# Patient Record
Sex: FEMALE | Race: WHITE | NOT HISPANIC OR LATINO | URBAN - METROPOLITAN AREA
[De-identification: names, ages, dates, MRNs, and addresses within clinical notes are randomized per-mention and may not be internally consistent; named-entity substitution may affect disease eponyms.]

---

## 2018-09-06 ENCOUNTER — APPOINTMENT (OUTPATIENT)
Age: 43
Setting detail: DERMATOLOGY
End: 2018-09-10

## 2018-09-06 VITALS
DIASTOLIC BLOOD PRESSURE: 100 MMHG | WEIGHT: 192 LBS | HEIGHT: 63 IN | SYSTOLIC BLOOD PRESSURE: 167 MMHG | HEART RATE: 70 BPM

## 2018-09-06 DIAGNOSIS — L10.0 PEMPHIGUS VULGARIS: ICD-10-CM

## 2018-09-06 PROBLEM — L30.9 DERMATITIS, UNSPECIFIED: Status: ACTIVE | Noted: 2018-09-06

## 2018-09-06 PROCEDURE — OTHER PATIENT SPECIFIC COUNSELING: OTHER

## 2018-09-06 PROCEDURE — OTHER PRESCRIPTION: OTHER

## 2018-09-06 PROCEDURE — 11100: CPT

## 2018-09-06 PROCEDURE — 99203 OFFICE O/P NEW LOW 30 MIN: CPT | Mod: 25

## 2018-09-06 PROCEDURE — OTHER COUNSELING: OTHER

## 2018-09-06 PROCEDURE — OTHER MIPS QUALITY: OTHER

## 2018-09-06 PROCEDURE — 11101: CPT

## 2018-09-06 PROCEDURE — OTHER BIOPSY BY PUNCH METHOD: OTHER

## 2018-09-06 RX ORDER — MUPIROCIN 20 MG/G
OINTMENT TOPICAL
Qty: 1 | Refills: 0 | Status: ERX | COMMUNITY
Start: 2018-09-06

## 2018-09-06 RX ORDER — PREDNISONE 10 MG/1
TABLET ORAL QAM
Qty: 70 | Refills: 0 | Status: ERX | COMMUNITY
Start: 2018-09-06

## 2018-09-06 ASSESSMENT — LOCATION DETAILED DESCRIPTION DERM
LOCATION DETAILED: LEFT RIB CAGE
LOCATION DETAILED: RIGHT BUTTOCK
LOCATION DETAILED: SUBXIPHOID
LOCATION DETAILED: RIGHT PROXIMAL POSTERIOR THIGH
LOCATION DETAILED: RIGHT ANTERIOR PROXIMAL THIGH
LOCATION DETAILED: MID POSTERIOR NECK

## 2018-09-06 ASSESSMENT — LOCATION SIMPLE DESCRIPTION DERM
LOCATION SIMPLE: RIGHT THIGH
LOCATION SIMPLE: POSTERIOR NECK
LOCATION SIMPLE: RIGHT BUTTOCK
LOCATION SIMPLE: ABDOMEN
LOCATION SIMPLE: RIGHT POSTERIOR THIGH

## 2018-09-06 ASSESSMENT — SEVERITY ASSESSMENT: SEVERITY: MILD

## 2018-09-06 ASSESSMENT — PAIN INTENSITY VAS: HOW INTENSE IS YOUR PAIN 0 BEING NO PAIN, 10 BEING THE MOST SEVERE PAIN POSSIBLE?: NO PAIN

## 2018-09-06 ASSESSMENT — BSA RASH: BSA RASH: 4

## 2018-09-06 ASSESSMENT — LOCATION ZONE DERM
LOCATION ZONE: TRUNK
LOCATION ZONE: NECK
LOCATION ZONE: LEG

## 2018-09-06 NOTE — PROCEDURE: BIOPSY BY PUNCH METHOD
Bill For Surgical Tray: no
Biopsy Type: DIF (perilesional)
Dressing: bandage
Home Suture Removal Text: Patient was provided a home suture removal kit and will remove their sutures at home.  If they have any questions or difficulties they will call the office.
Anesthesia Type: 1% lidocaine without epinephrine and a 1:10 solution of 8.4% sodium bicarbonate
Notification Instructions: Patient will be notified of biopsy results. However, patient instructed to call the office if not contacted within 2 weeks.
Wound Care: Petrolatum
Size Of Lesion In Cm (Optional): 0
Suture Removal: 14 days
Detail Level: Detailed
Punch Size In Mm: 4
Post-Care Instructions: I reviewed with the patient in detail post-care instructions. Patient is to keep the biopsy site dry overnight, and then apply bacitracin twice daily until healed. Patient may apply hydrogen peroxide soaks to remove any crusting.
Consent: Written consent was obtained and risks were reviewed including but not limited to scarring, infection, bleeding, scabbing, incomplete removal, nerve damage and allergy to anesthesia.
Was A Bandage Applied: Yes
Anesthesia Volume In Cc (Will Not Render If 0): 0.5
Epidermal Sutures: 4-0 Nylon
Hemostasis: None
Biopsy Type: H and E
Billing Type: Third-Party Bill

## 2018-09-06 NOTE — PROCEDURE: PATIENT SPECIFIC COUNSELING
Discussed potential differential diagnoses with the patient. Recommended that a punch biopsy be performed today to a new lesion to provide a definitive diagnosis before further treatment is given. Instructed the patient to apply mupirocin 2% ointment to all open lesions and blisters to promote healing. Instructed the patient to discontinue use of azithromycin 250 mg and instead initiate use of prednisone 10 mg, taking one pill by mouth four times a day for seven days then decrease by one pill every seven days. Explained that once pathology results are received, a referral to [facility] may be necessary for further treatment. The patient will follow up in two weeks for suture removal. Discussed potential differential diagnoses with the patient. Recommended that a punch biopsy be performed today to a new lesion to provide a definitive diagnosis before further treatment is given. Instructed the patient to apply mupirocin 2% ointment to all open lesions and blisters to promote healing. Instructed the patient to discontinue use of azithromycin 250 mg and instead initiate use of prednisone 10 mg, taking one pill by mouth four times a day for seven days then decrease by one pill every seven days. Explained that once pathology results are received, a referral to [facility] may be necessary for further treatment. The patient will follow up in two weeks for suture removal.

## 2018-09-20 ENCOUNTER — APPOINTMENT (OUTPATIENT)
Age: 43
Setting detail: DERMATOLOGY
End: 2018-09-21

## 2018-09-20 VITALS
WEIGHT: 185 LBS | DIASTOLIC BLOOD PRESSURE: 100 MMHG | HEART RATE: 82 BPM | HEIGHT: 63 IN | SYSTOLIC BLOOD PRESSURE: 177 MMHG

## 2018-09-20 DIAGNOSIS — L01.03 BULLOUS IMPETIGO: ICD-10-CM

## 2018-09-20 PROCEDURE — OTHER PATIENT SPECIFIC COUNSELING: OTHER

## 2018-09-20 PROCEDURE — OTHER MIPS QUALITY: OTHER

## 2018-09-20 PROCEDURE — OTHER PATHOLOGY DISCUSSION: OTHER

## 2018-09-20 PROCEDURE — 99213 OFFICE O/P EST LOW 20 MIN: CPT

## 2018-09-20 PROCEDURE — OTHER COUNSELING: OTHER

## 2018-09-20 ASSESSMENT — LOCATION ZONE DERM: LOCATION ZONE: TRUNK

## 2018-09-20 ASSESSMENT — LOCATION SIMPLE DESCRIPTION DERM: LOCATION SIMPLE: RIGHT BUTTOCK

## 2018-09-20 ASSESSMENT — LOCATION DETAILED DESCRIPTION DERM: LOCATION DETAILED: RIGHT BUTTOCK

## 2018-09-20 NOTE — PROCEDURE: PATIENT SPECIFIC COUNSELING
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 Appointment with the [facility] with Dr. Hurd has been cancelled as the rash has resolved and is negative for any systemic involvement. Explained to the patient that the condition is potentially contagious, explaining why her daughter has acquired the condition. However, due to the rash clearing she is no longer contagious. Explained potential causes of condition, however a definitive reason can not be determined at this time. Advised to apply mupirocin 2% ointment as needed should the condition recur. 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 Appointment with the [facility] with Dr. Hurd has been cancelled as the rash has resolved and is negative for any systemic involvement. Explained to the patient that the condition is potentially contagious, explaining why her daughter has acquired the condition. However, due to the rash clearing she is no longer contagious. Explained potential causes of condition, however a definitive reason can not be determined at this time. Advised to apply mupirocin 2% ointment as needed should the condition recur.

## 2019-05-24 NOTE — PROCEDURE: MIPS QUALITY
P DMP reviewed and refilled  
Detail Level: Zone
Quality 226: Preventive Care And Screening: Tobacco Use: Screening And Cessation Intervention: Patient screened for tobacco and never smoked
Quality 131: Pain Assessment And Follow-Up: Pain assessment using a standardized tool is documented as negative, no follow-up plan required
Quality 265: Biopsy Follow-Up: Biopsy results reviewed, communicated, tracked, and documented